# Patient Record
Sex: MALE
[De-identification: names, ages, dates, MRNs, and addresses within clinical notes are randomized per-mention and may not be internally consistent; named-entity substitution may affect disease eponyms.]

---

## 2023-07-21 ENCOUNTER — APPOINTMENT (OUTPATIENT)
Dept: ORTHOPEDIC SURGERY | Facility: CLINIC | Age: 28
End: 2023-07-21
Payer: COMMERCIAL

## 2023-07-21 VITALS — BODY MASS INDEX: 23.07 KG/M2 | WEIGHT: 147 LBS | HEIGHT: 67 IN

## 2023-07-21 DIAGNOSIS — Z78.9 OTHER SPECIFIED HEALTH STATUS: ICD-10-CM

## 2023-07-21 DIAGNOSIS — Z80.9 FAMILY HISTORY OF MALIGNANT NEOPLASM, UNSPECIFIED: ICD-10-CM

## 2023-07-21 DIAGNOSIS — Z86.79 PERSONAL HISTORY OF OTHER DISEASES OF THE CIRCULATORY SYSTEM: ICD-10-CM

## 2023-07-21 PROBLEM — Z00.00 ENCOUNTER FOR PREVENTIVE HEALTH EXAMINATION: Status: ACTIVE | Noted: 2023-07-21

## 2023-07-21 PROCEDURE — 73030 X-RAY EXAM OF SHOULDER: CPT | Mod: LT

## 2023-07-21 PROCEDURE — 99204 OFFICE O/P NEW MOD 45 MIN: CPT

## 2023-07-21 RX ORDER — MORPHINE SULFATE 30 MG/1
TABLET ORAL
Refills: 0 | Status: ACTIVE | COMMUNITY

## 2023-07-21 RX ORDER — ACETAMINOPHEN 500 MG/1
500 TABLET ORAL
Qty: 84 | Refills: 0 | Status: ACTIVE | COMMUNITY
Start: 2023-07-21 | End: 1900-01-01

## 2023-07-21 RX ORDER — OXYCODONE HYDROCHLORIDE 5 MG/1
CAPSULE ORAL
Refills: 0 | Status: ACTIVE | COMMUNITY

## 2023-07-21 RX ORDER — CELECOXIB 200 MG/1
200 CAPSULE ORAL
Qty: 30 | Refills: 0 | Status: ACTIVE | COMMUNITY
Start: 2023-07-21 | End: 1900-01-01

## 2023-07-24 ENCOUNTER — APPOINTMENT (OUTPATIENT)
Dept: ORTHOPEDIC SURGERY | Facility: CLINIC | Age: 28
End: 2023-07-24
Payer: COMMERCIAL

## 2023-07-24 VITALS
DIASTOLIC BLOOD PRESSURE: 72 MMHG | BODY MASS INDEX: 23.07 KG/M2 | RESPIRATION RATE: 16 BRPM | HEART RATE: 67 BPM | SYSTOLIC BLOOD PRESSURE: 117 MMHG | HEIGHT: 67 IN | OXYGEN SATURATION: 98 % | TEMPERATURE: 97.8 F | WEIGHT: 147 LBS

## 2023-07-24 DIAGNOSIS — S42.032A DISPLACED FRACTURE OF LATERAL END OF LEFT CLAVICLE, INITIAL ENCOUNTER FOR CLOSED FRACTURE: ICD-10-CM

## 2023-07-24 PROCEDURE — 99214 OFFICE O/P EST MOD 30 MIN: CPT

## 2023-07-26 RX ORDER — CELECOXIB 100 MG/1
100 CAPSULE ORAL
Qty: 60 | Refills: 0 | Status: ACTIVE | COMMUNITY
Start: 2023-07-26 | End: 1900-01-01

## 2023-07-26 RX ORDER — HYDROCODONE BITARTRATE AND ACETAMINOPHEN 5; 325 MG/1; MG/1
5-325 TABLET ORAL
Qty: 30 | Refills: 0 | Status: ACTIVE | COMMUNITY
Start: 2023-07-26 | End: 1900-01-01

## 2023-07-27 ENCOUNTER — APPOINTMENT (OUTPATIENT)
Age: 28
End: 2023-07-27

## 2024-04-30 ENCOUNTER — APPOINTMENT (OUTPATIENT)
Dept: PHYSICAL MEDICINE AND REHAB | Facility: CLINIC | Age: 29
End: 2024-04-30
Payer: COMMERCIAL

## 2024-04-30 VITALS
DIASTOLIC BLOOD PRESSURE: 60 MMHG | SYSTOLIC BLOOD PRESSURE: 119 MMHG | WEIGHT: 150 LBS | HEIGHT: 67 IN | BODY MASS INDEX: 23.54 KG/M2

## 2024-04-30 VITALS
BODY MASS INDEX: 22.76 KG/M2 | HEIGHT: 67 IN | DIASTOLIC BLOOD PRESSURE: 66 MMHG | RESPIRATION RATE: 18 BRPM | SYSTOLIC BLOOD PRESSURE: 119 MMHG | WEIGHT: 145 LBS | HEART RATE: 61 BPM

## 2024-04-30 VITALS
DIASTOLIC BLOOD PRESSURE: 66 MMHG | SYSTOLIC BLOOD PRESSURE: 119 MMHG | WEIGHT: 145 LBS | HEIGHT: 67 IN | BODY MASS INDEX: 22.76 KG/M2 | HEART RATE: 61 BPM | RESPIRATION RATE: 18 BRPM

## 2024-04-30 DIAGNOSIS — R61 GENERALIZED HYPERHIDROSIS: ICD-10-CM

## 2024-04-30 PROCEDURE — 99214 OFFICE O/P EST MOD 30 MIN: CPT

## 2024-04-30 RX ORDER — ONABOTULINUMTOXINA 200 [USP'U]/1
200 INJECTION, POWDER, LYOPHILIZED, FOR SOLUTION INTRADERMAL; INTRAMUSCULAR
Qty: 100 | Refills: 0 | Status: ACTIVE | OUTPATIENT
Start: 2024-04-30

## 2024-04-30 NOTE — ASSESSMENT
[FreeTextEntry1] : Risks of neurotoxin reviewed with patient which include but are not limited to :idiosyncratic allergic/anaphylactic response -or distal spread of toxin with possible dysphagia,ptosis, muscle aching /weakness or fatigue ,dry eyes/ dry mouth -all latter self limited within weeks as toxin wears off .. all questions answered and handout outlining uses and risks /benefits given to patient  excellent candidate for medical botox  will request 200 units -has very large axillae The patient had the opportunity to ask questions and all were answered to their satisfaction. We will coordinate treatment with the other members of the treatment team. The patient verbalized understanding of the management/plan rationale and agreed with my recommendations. time spent including corn starch test 35 mins

## 2024-04-30 NOTE — HISTORY OF PRESENT ILLNESS
[FreeTextEntry1] : male 29 year - old referred by another patient  for intractable hyperhidrosis  Hyperhidrosis is a condition whereby excessive sweating is produced by the sweat glands of the axillae, palms or soles. This conditions can impact on the job and is socially embarrassing with malodorous odor, inability to wear white clothing, significant staining, problems in meetings or social situations.  Site of Sweating: axillae, Degree: grade 4 How Lon years very profuse since Covid  Age Started:25 Family History of Sweating:  No Family History of Neurological disease: No Past Medical HIstory of Diabetes / Thyroid: No Other Treatments Tried: Drysol: Yes when started -saw dermatologist  - Effect: Rash, Burning, Short lived efficacy Other Aluminium Deodorants: Yes all of them  impacts his life at work as well as socially  cannot wear white shirts  has to throw out tops as staining not able to be washed out  embarrassed when presenting in front of co workers during meetings   Discussed- frequent risks and benefits.  Frequent adverse reactions are dysphagia 19% (usually mild), URTI (12%), neck pain 11%, ptosis 20%, and malaise. Side effects only last days to weeks, and are self limiting.  Rare complications include: anaphylaxis and bleeding  Protocol -Botox naive patients start with lower dose -Dosing subsequently adjusted based on response, body weight, muscle hypertrophy -Botox repeated every three months  Contraindications: -Aminoglycoside antibiotics concurrently -Neuro-muscular transmission disease or drugs -Pregnancy - Lactation - Neuro-muscular diseases  - Neuro-transmitter drugs    Patient wishes to proceed.  Explained Botox as treatment for Hyperhidrosis. Will start process of authorization for injection and medication.  Patient will be notified once obtained.

## 2024-04-30 NOTE — REVIEW OF SYSTEMS
[Patient Intake Form Reviewed] : Patient intake form was reviewed [Recent Change In Weight] : ~T recent weight change [Fever] : no fever [Chills] : no chills [Fatigue] : no fatigue [Skin Rash] : no skin rash [Skin Wound] : no skin wound [FreeTextEntry2] : lost weight with his arm fx -now regaining and building up muscle mass

## 2024-04-30 NOTE — PROCEDURE
[de-identified] : REEMA CENTENO underwent a [place]'s Iodine/Starch test in the office to localize areas of excessive sweating. Areas were cleaned and then prepped with Povidine Iodine Solution. Cornstarch powder was applied. After several minutes  multiple areas turned intensely purple.  These areas were noted for future injection planning.

## 2024-05-06 PROBLEM — L74.510 AXILLARY HYPERHIDROSIS: Status: ACTIVE | Noted: 2024-04-30

## 2024-05-06 NOTE — HISTORY OF PRESENT ILLNESS
[FreeTextEntry1] : male 29 year - old  for intractable hyperhidrosis   Hyperhidrosis is a condition whereby excessive sweating is produced by the sweat glands of the axillae, palms or soles. These conditions can impact on the job and is socially embarrassing with malodorous odor, inability to wear white clothing, significant staining, problems in meetings or social situations.   Site of Sweating: axillae, craniofacial, palms, soles, other Degree: grade 4 How Lon y Age Started: 25 years old Family History of Sweating:  not sure Family History of Neurological disease: No Past Medical History of Diabetes / Thyroid: No Other Treatments Tried: Drysol: Yes/No    When:                         Effect: Rash, Burning, Short lived efficacy. Other Aluminum Deodorants: Yes/ No   Discussed- frequent risks and benefits.  Frequent adverse reactions are dysphagia 19% (usually mild), URTI (12%), neck pain 11%, ptosis 20%, and malaise. Side effects only last days to weeks and are self-limiting. Rare complications include anaphylaxis and bleeding.   Protocol -Botox naive patients start with lower dose -Dosing subsequently adjusted based on response, body weight, muscle hypertrophy -Botox repeated every three months   Contraindications: -Aminoglycoside antibiotics concurrently -Neuro-muscular transmission disease or drugs -Pregnancy - Lactation - Neuro-muscular diseases - Neuro-transmitter drugs       Patient wishes to proceed.  Explained Botox as treatment for Hyperhidrosis. Will start process of authorization for injection and medication.  Patient will be notified once obtained.

## 2024-05-06 NOTE — REVIEW OF SYSTEMS
[Patient Intake Form Reviewed] : Patient intake form was reviewed [Fever] : no fever [Chills] : no chills [Fatigue] : no fatigue [Skin Rash] : no skin rash

## 2024-05-06 NOTE — ASSESSMENT
[FreeTextEntry1] :   PLAN AND RECOMMENDATIONS :   We discussed differential diagnosis and clinical impression  good candidate botox 200 units ] has large axillae   Recommend .symptomatic care and support  medications NA  imaging NA   hydrotherapy /heat / cold for pain  continue deodorants  relative rest and avoidance of painful activity where possible  increasing activity as discussed  return for follow up.once approved  Risks of neurotoxin reviewed with patient which include but are not limited to :idiosyncratic allergic/anaphylactic response -or distal spread of toxin with possible dysphagia,ptosis, muscle aching /weakness or fatigue ,dry eyes/ dry mouth -all latter self limited within weeks as toxin wears off .. all questions answered and handout outlining uses and risks /benefits given to patient

## 2024-05-06 NOTE — PROCEDURE
[de-identified] : REEMA CENTENO underwent a [place]'s Iodine/Starch test in the office to localize areas of excessive sweating. Areas were cleaned and then prepped with Povidine Iodine Solution. Cornstarch powder was applied. After several minutes  multiple areas turned intensely purple.  These areas were noted for future injection planning.

## 2024-05-30 ENCOUNTER — APPOINTMENT (OUTPATIENT)
Dept: PHYSICAL MEDICINE AND REHAB | Facility: CLINIC | Age: 29
End: 2024-05-30

## 2024-05-30 DIAGNOSIS — L74.510 PRIMARY FOCAL HYPERHIDROSIS, AXILLA: ICD-10-CM

## 2024-08-13 ENCOUNTER — APPOINTMENT (OUTPATIENT)
Dept: PHYSICAL MEDICINE AND REHAB | Facility: CLINIC | Age: 29
End: 2024-08-13
Payer: COMMERCIAL

## 2024-08-13 PROCEDURE — 64650 CHEMODENERV ECCRINE GLANDS: CPT

## 2024-08-13 NOTE — PROCEDURE
[de-identified] :   returns for injections   Discussed- frequent risks and benefits.  Frequent adverse reactions are dysphagia 19% (usually mild), URTI (12%), neck pain 11%, ptosis 20%, and malaise. Side effects only last days to weeks, and are self limiting.  Rare complications include: anaphylaxis and bleeding  Protocol -Botox naive patients start with lower dose -Dosing subsequently adjusted based on response, body weight, muscle hypertrophy -Botox repeated every three months  Contraindications: -Aminoglycoside antibiotics concurrently -Neuro-muscular transmission disease or drugs -Pregnancy NA - Lactation NA - Neuro-muscular diseases  - Neuro-transmitter drugs  Botox hyperhidrosis  Procedure: Botulinum toxin injection Indication: Socially debilitating hyperhidrosis Ethyl Chloride spray and ice applied to ease pain of injection Antiseptic: Alcohol   Neurotoxin Procedure Note Botulinum Toxin A (OnabotulinumtoxinA) Dilution: 1:4 with preservative free saline to dissolve X vials injected as per protocol. Estimated Blood Loss: Minimal Complications: Patient tolerated procedure well.  Prior to injection, risks were briefly reiterated to the patient.  The affected areas were treated in a grid like pattern per location, in equally divided doses.  SITES: (R) & (L) axilla   DOSAGE:  200 units  NUMBER OF INJECTIONS: 20 NOTE: 100 units each side divided equally

## 2024-12-04 ENCOUNTER — APPOINTMENT (OUTPATIENT)
Dept: PHYSICAL MEDICINE AND REHAB | Facility: CLINIC | Age: 29
End: 2024-12-04
Payer: COMMERCIAL

## 2024-12-04 ENCOUNTER — NON-APPOINTMENT (OUTPATIENT)
Age: 29
End: 2024-12-04

## 2024-12-04 VITALS
HEIGHT: 67 IN | SYSTOLIC BLOOD PRESSURE: 123 MMHG | HEART RATE: 97 BPM | WEIGHT: 150 LBS | DIASTOLIC BLOOD PRESSURE: 67 MMHG | BODY MASS INDEX: 23.54 KG/M2 | RESPIRATION RATE: 18 BRPM

## 2024-12-04 DIAGNOSIS — L74.510 PRIMARY FOCAL HYPERHIDROSIS, AXILLA: ICD-10-CM

## 2024-12-04 PROCEDURE — 64650 CHEMODENERV ECCRINE GLANDS: CPT

## 2024-12-04 NOTE — PROCEDURE
[de-identified] :   returns for injections   Discussed- frequent risks and benefits.  Frequent adverse reactions are dysphagia 19% (usually mild), URTI (12%), neck pain 11%, ptosis 20%, and malaise. Side effects only last days to weeks, and are self limiting.  Rare complications include: anaphylaxis and bleeding  Protocol -Botox naive patients start with lower dose -Dosing subsequently adjusted based on response, body weight, muscle hypertrophy -Botox repeated every three months  Contraindications: -Aminoglycoside antibiotics concurrently -Neuro-muscular transmission disease or drugs -Pregnancy NA - Lactation NA - Neuro-muscular diseases  - Neuro-transmitter drugs  Botox hyperhidrosis  Procedure: Botulinum toxin injection Indication: Socially debilitating hyperhidrosis Ethyl Chloride spray and ice applied to ease pain of injection Antiseptic: Alcohol   Neurotoxin Procedure Note Botulinum Toxin A (OnabotulinumtoxinA) Dilution: 1:4 with preservative free saline to dissolve X vials injected as per protocol. Estimated Blood Loss: Minimal Complications: Patient tolerated procedure well.  Prior to injection, risks were briefly reiterated to the patient.  The affected areas were treated in a grid like pattern per location, in equally divided doses.  SITES: (R) & (L) axilla   DOSAGE:  200 units  NUMBER OF INJECTIONS: 20 NOTE: 100 units each side divided equally

## 2025-07-31 ENCOUNTER — APPOINTMENT (OUTPATIENT)
Dept: PHYSICAL MEDICINE AND REHAB | Facility: CLINIC | Age: 30
End: 2025-07-31
Payer: COMMERCIAL

## 2025-07-31 DIAGNOSIS — L74.510 PRIMARY FOCAL HYPERHIDROSIS, AXILLA: ICD-10-CM

## 2025-07-31 PROCEDURE — 99213 OFFICE O/P EST LOW 20 MIN: CPT

## 2025-08-04 VITALS — HEIGHT: 67 IN | WEIGHT: 150 LBS | BODY MASS INDEX: 23.54 KG/M2

## 2025-08-04 RX ORDER — ONABOTULINUMTOXINA 200 [USP'U]/1
200 INJECTION, POWDER, LYOPHILIZED, FOR SOLUTION INTRADERMAL; INTRAMUSCULAR
Qty: 200 | Refills: 0 | Status: ACTIVE | COMMUNITY
Start: 2025-08-04 | End: 1900-01-01